# Patient Record
Sex: MALE | Race: OTHER | NOT HISPANIC OR LATINO | URBAN - METROPOLITAN AREA
[De-identification: names, ages, dates, MRNs, and addresses within clinical notes are randomized per-mention and may not be internally consistent; named-entity substitution may affect disease eponyms.]

---

## 2017-10-28 ENCOUNTER — EMERGENCY (EMERGENCY)
Facility: HOSPITAL | Age: 47
LOS: 0 days | Discharge: HOME | End: 2017-10-28
Attending: STUDENT IN AN ORGANIZED HEALTH CARE EDUCATION/TRAINING PROGRAM
Payer: COMMERCIAL

## 2017-10-28 VITALS
HEART RATE: 73 BPM | TEMPERATURE: 98 F | WEIGHT: 220.02 LBS | HEIGHT: 69 IN | OXYGEN SATURATION: 100 % | RESPIRATION RATE: 15 BRPM | SYSTOLIC BLOOD PRESSURE: 167 MMHG | DIASTOLIC BLOOD PRESSURE: 85 MMHG

## 2017-10-28 VITALS
SYSTOLIC BLOOD PRESSURE: 152 MMHG | DIASTOLIC BLOOD PRESSURE: 100 MMHG | RESPIRATION RATE: 16 BRPM | TEMPERATURE: 98 F | OXYGEN SATURATION: 98 % | HEART RATE: 71 BPM
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
## 2017-10-28 DIAGNOSIS — S39.012A STRAIN OF MUSCLE, FASCIA AND TENDON OF LOWER BACK, INITIAL ENCOUNTER: ICD-10-CM

## 2017-10-28 DIAGNOSIS — S16.1XXS STRAIN OF MUSCLE, FASCIA AND TENDON AT NECK LEVEL, SEQUELA: ICD-10-CM
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
## 2017-10-28 DIAGNOSIS — M54.9 DORSALGIA, UNSPECIFIED: ICD-10-CM

## 2017-10-28 PROCEDURE — 99053 MED SERV 10PM-8AM 24 HR FAC: CPT

## 2017-10-28 PROCEDURE — 99284 EMERGENCY DEPT VISIT MOD MDM: CPT | Mod: 25

## 2017-10-28 PROCEDURE — 70450 CT HEAD/BRAIN W/O DYE: CPT | Mod: 26

## 2017-10-28 RX ORDER — HYDROCHLOROTHIAZIDE 25 MG
25 TABLET ORAL ONCE
Qty: 0 | Refills: 0 | Status: COMPLETED | OUTPATIENT
Start: 2017-10-28 | End: 2017-10-28

## 2017-10-28 RX ADMIN — Medication 25 MILLIGRAM(S): at 09:43

## 2017-10-28 NOTE — ED PROVIDER NOTE - CARE PLAN
Principal Discharge DX:	Strain of neck muscle, sequela  Secondary Diagnosis:	Lumbar strain  Secondary Diagnosis:	Back spasm
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
## 2019-05-28 ENCOUNTER — EMERGENCY (EMERGENCY)
Facility: HOSPITAL | Age: 49
LOS: 0 days | Discharge: LEFT BEFORE DISCHARGE | End: 2019-05-28
Attending: EMERGENCY MEDICINE
Payer: MEDICAID

## 2019-05-28 VITALS
HEIGHT: 69 IN | OXYGEN SATURATION: 95 % | DIASTOLIC BLOOD PRESSURE: 116 MMHG | HEART RATE: 75 BPM | RESPIRATION RATE: 18 BRPM | WEIGHT: 220.02 LBS | TEMPERATURE: 99 F | SYSTOLIC BLOOD PRESSURE: 175 MMHG

## 2019-05-28 VITALS
OXYGEN SATURATION: 100 % | HEART RATE: 65 BPM | RESPIRATION RATE: 19 BRPM | TEMPERATURE: 99 F | SYSTOLIC BLOOD PRESSURE: 185 MMHG | DIASTOLIC BLOOD PRESSURE: 110 MMHG

## 2019-05-28 DIAGNOSIS — I10 ESSENTIAL (PRIMARY) HYPERTENSION: ICD-10-CM

## 2019-05-28 DIAGNOSIS — R60.0 LOCALIZED EDEMA: ICD-10-CM

## 2019-05-28 DIAGNOSIS — M25.561 PAIN IN RIGHT KNEE: ICD-10-CM

## 2019-05-28 DIAGNOSIS — R06.02 SHORTNESS OF BREATH: ICD-10-CM

## 2019-05-28 DIAGNOSIS — M25.562 PAIN IN LEFT KNEE: ICD-10-CM

## 2019-05-28 LAB
ALBUMIN SERPL ELPH-MCNC: 3.4 G/DL — SIGNIFICANT CHANGE UP (ref 3.3–5)
ALP SERPL-CCNC: 71 U/L — SIGNIFICANT CHANGE UP (ref 40–120)
ALT FLD-CCNC: 22 U/L — SIGNIFICANT CHANGE UP (ref 12–78)
ANION GAP SERPL CALC-SCNC: 9 MMOL/L — SIGNIFICANT CHANGE UP (ref 5–17)
APTT BLD: 28.2 SEC — LOW (ref 28.5–37)
AST SERPL-CCNC: 14 U/L — LOW (ref 15–37)
BILIRUB SERPL-MCNC: 0.5 MG/DL — SIGNIFICANT CHANGE UP (ref 0.2–1.2)
BUN SERPL-MCNC: 20 MG/DL — SIGNIFICANT CHANGE UP (ref 7–23)
CALCIUM SERPL-MCNC: 9 MG/DL — SIGNIFICANT CHANGE UP (ref 8.5–10.1)
CHLORIDE SERPL-SCNC: 107 MMOL/L — SIGNIFICANT CHANGE UP (ref 96–108)
CO2 SERPL-SCNC: 25 MMOL/L — SIGNIFICANT CHANGE UP (ref 22–31)
CREAT SERPL-MCNC: 1.16 MG/DL — SIGNIFICANT CHANGE UP (ref 0.5–1.3)
D DIMER BLD IA.RAPID-MCNC: <150 NG/ML DDU — SIGNIFICANT CHANGE UP
GLUCOSE SERPL-MCNC: 86 MG/DL — SIGNIFICANT CHANGE UP (ref 70–99)
HCT VFR BLD CALC: 45.8 % — SIGNIFICANT CHANGE UP (ref 39–50)
HGB BLD-MCNC: 15.6 G/DL — SIGNIFICANT CHANGE UP (ref 13–17)
INR BLD: 1.05 RATIO — SIGNIFICANT CHANGE UP (ref 0.88–1.16)
MCHC RBC-ENTMCNC: 30.4 PG — SIGNIFICANT CHANGE UP (ref 27–34)
MCHC RBC-ENTMCNC: 34.1 GM/DL — SIGNIFICANT CHANGE UP (ref 32–36)
MCV RBC AUTO: 89.1 FL — SIGNIFICANT CHANGE UP (ref 80–100)
NRBC # BLD: 0 /100 WBCS — SIGNIFICANT CHANGE UP (ref 0–0)
PLATELET # BLD AUTO: 175 K/UL — SIGNIFICANT CHANGE UP (ref 150–400)
POTASSIUM SERPL-MCNC: 4 MMOL/L — SIGNIFICANT CHANGE UP (ref 3.5–5.3)
POTASSIUM SERPL-SCNC: 4 MMOL/L — SIGNIFICANT CHANGE UP (ref 3.5–5.3)
PROT SERPL-MCNC: 7.2 GM/DL — SIGNIFICANT CHANGE UP (ref 6–8.3)
PROTHROM AB SERPL-ACNC: 11.8 SEC — SIGNIFICANT CHANGE UP (ref 10–12.9)
RBC # BLD: 5.14 M/UL — SIGNIFICANT CHANGE UP (ref 4.2–5.8)
RBC # FLD: 12.6 % — SIGNIFICANT CHANGE UP (ref 10.3–14.5)
SODIUM SERPL-SCNC: 141 MMOL/L — SIGNIFICANT CHANGE UP (ref 135–145)
TROPONIN I SERPL-MCNC: <.015 NG/ML — SIGNIFICANT CHANGE UP (ref 0.01–0.04)
WBC # BLD: 3.62 K/UL — LOW (ref 3.8–10.5)
WBC # FLD AUTO: 3.62 K/UL — LOW (ref 3.8–10.5)

## 2019-05-28 PROCEDURE — 93010 ELECTROCARDIOGRAM REPORT: CPT

## 2019-05-28 PROCEDURE — 99284 EMERGENCY DEPT VISIT MOD MDM: CPT

## 2019-05-28 PROCEDURE — 93970 EXTREMITY STUDY: CPT | Mod: 26

## 2019-05-28 PROCEDURE — 71045 X-RAY EXAM CHEST 1 VIEW: CPT | Mod: 26

## 2019-05-28 RX ADMIN — Medication 0.1 MILLIGRAM(S): at 15:08

## 2019-05-28 NOTE — ED PROVIDER NOTE - CARE PROVIDER_API CALL
Yusef Huitron)  Internal Medicine  [address], [address]  [place] [address]  Phone: [phone]  Fax: [phone]  Follow Up Time:     Sana Lux)  Surgery  [address], [address]  [place] [address]  Phone: [phone]  Fax: [phone]  Follow Up Time:

## 2019-05-28 NOTE — ED PROVIDER NOTE - PHYSICAL EXAMINATION
General:     NAD, well-nourished, well-appearing  Head:     NC/AT, EOMI, oral mucosa moist  Neck:     trachea midline  Lungs:     CTA b/l, no w/r/r  CVS:     S1S2, RRR, no m/g/r  Abd:     +BS, s/nt/nd, no organomegaly  Ext:    2+ radial and pedal pulses, trace bilateral pedal edema  Neuro: AAOx3, no sensory/motor deficits
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
## 2019-05-28 NOTE — ED ADULT NURSE NOTE - NSIMPLEMENTINTERV_GEN_ALL_ED
Implemented All Universal Safety Interventions:  [place] to call system. Call bell, personal items and telephone within reach. Instruct patient to call for assistance. Room bathroom lighting operational. Non-slip footwear when patient is off stretcher. Physically safe environment: no spills, clutter or unnecessary equipment. Stretcher in lowest position, wheels locked, appropriate side rails in place.

## 2019-05-28 NOTE — ED PROVIDER NOTE - CLINICAL SUMMARY MEDICAL DECISION MAKING FREE TEXT BOX
Patient arrived with elevated BP and leg swelling, us with no dvt, f/up with pmd and vascular.  patient refusing to stay for further evaluation and monitoring of bp because he has to  daughter. informed he is leaving [place]. The patient has decided to leave against medical advice.  We discussed all risks, benefits, and alternatives to the progression of treatment and the potential dangers of leaving including but not limited to permanent disability, injury, and death.  The patient was instructed that they are welcome to change their decision to leave against medical advice and return to the emergency department at any time and for any reason in order to allow us to render care.

## 2019-05-28 NOTE — ED ADULT NURSE NOTE - OBJECTIVE STATEMENT
pt received to holding bed F c/o bilateral leg pain, pain is worst to the right knee. 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

## 2019-05-28 NOTE — ED PROVIDER NOTE - NS ED ROS FT
Constitutional: (-) fever  (-)chills  (-)sweats  Eyes/ENT: (-) blurry vision, (-) epistaxis  (-)rhinorrhea   (-) sore throat    Cardiovascular: (-) chest pain, (-) palpitations (-) edema   Respiratory: (-) cough, (+) shortness of breath   Gastrointestinal: (-)nausea  (-)vomiting, (-) diarrhea  (-) abdominal pain   :  (-)dysuria, (-)frequency, (-)urgency, (-)hematuria  Musculoskeletal: (-) neck pain, (-) back pain, (+) joint pain  Integumentary: (-) rash, (+) edema  Neurological: (-) headache, (-) altered mental status  (-)LOC

## 2019-05-28 NOTE — ED ADULT TRIAGE NOTE - CHIEF COMPLAINT QUOTE
patient c/o of R knee swelling and pain started 2 months ago, denied chest pain , denied back pain denied difficulty breathing at the time of triage, denied any pain at the time of triage

## 2019-05-28 NOTE — ED PROVIDER NOTE - NSFOLLOWUPINSTRUCTIONS_ED_ALL_ED_FT
You have decided to leave against medical advice.  We discussed all risks, benefits, and alternatives to the progression of treatment and the potential dangers of leaving including but not limited to permanent disability, injury, and death.  You are welcome to change your decision to leave against medical advice and return to the emergency department at any time and for any reason in order to allow us to render care.

## 2021-01-11 NOTE — ED ADULT NURSE NOTE - NSSUSCREENINGQ1_ED_ALL_ED
How Severe Is It?: moderate Is This A New Presentation, Or A Follow-Up?: Follow Up Isotretinoin Display Cumulative Dose In The Note?: Yes When Was Isotretinoin Started?: 12/07/2020 Patient Reported Weight In Pounds (Required For Calculation): 252 No

## 2022-04-29 ENCOUNTER — EMERGENCY (EMERGENCY)
Facility: HOSPITAL | Age: 52
LOS: 0 days | Discharge: HOME | End: 2022-04-29
Payer: MEDICAID

## 2022-04-29 ENCOUNTER — EMERGENCY (EMERGENCY)
Facility: HOSPITAL | Age: 52
LOS: 0 days | Discharge: HOME | End: 2022-04-29
Attending: STUDENT IN AN ORGANIZED HEALTH CARE EDUCATION/TRAINING PROGRAM
Payer: COMMERCIAL

## 2022-04-29 VITALS
DIASTOLIC BLOOD PRESSURE: 103 MMHG | RESPIRATION RATE: 18 BRPM | TEMPERATURE: 98 F | OXYGEN SATURATION: 99 % | HEART RATE: 93 BPM | SYSTOLIC BLOOD PRESSURE: 163 MMHG

## 2022-04-29 VITALS
TEMPERATURE: 98 F | SYSTOLIC BLOOD PRESSURE: 170 MMHG | HEART RATE: 105 BPM | WEIGHT: 220.02 LBS | RESPIRATION RATE: 18 BRPM | HEIGHT: 69 IN | OXYGEN SATURATION: 97 % | DIASTOLIC BLOOD PRESSURE: 111 MMHG

## 2022-04-29 VITALS
SYSTOLIC BLOOD PRESSURE: 188 MMHG | HEIGHT: 69 IN | DIASTOLIC BLOOD PRESSURE: 122 MMHG | WEIGHT: 220.02 LBS | TEMPERATURE: 98 F | HEART RATE: 77 BPM | RESPIRATION RATE: 18 BRPM | OXYGEN SATURATION: 97 %

## 2022-04-29 VITALS
OXYGEN SATURATION: 99 % | TEMPERATURE: 98 F | RESPIRATION RATE: 15 BRPM | DIASTOLIC BLOOD PRESSURE: 86 MMHG | HEART RATE: 73 BPM | SYSTOLIC BLOOD PRESSURE: 139 MMHG

## 2022-04-29 DIAGNOSIS — R51.9 HEADACHE, UNSPECIFIED: ICD-10-CM

## 2022-04-29 DIAGNOSIS — S13.4XXA SPRAIN OF LIGAMENTS OF CERVICAL SPINE, INITIAL ENCOUNTER: ICD-10-CM

## 2022-04-29 DIAGNOSIS — M54.2 CERVICALGIA: ICD-10-CM
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
## 2022-04-29 DIAGNOSIS — M25.512 PAIN IN LEFT SHOULDER: ICD-10-CM

## 2022-04-29 DIAGNOSIS — E78.5 HYPERLIPIDEMIA, UNSPECIFIED: ICD-10-CM
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
## 2022-04-29 DIAGNOSIS — S16.1XXA STRAIN OF MUSCLE, FASCIA AND TENDON AT NECK LEVEL, INITIAL ENCOUNTER: ICD-10-CM

## 2022-04-29 DIAGNOSIS — S39.012A STRAIN OF MUSCLE, FASCIA AND TENDON OF LOWER BACK, INITIAL ENCOUNTER: ICD-10-CM

## 2022-04-29 DIAGNOSIS — I10 ESSENTIAL (PRIMARY) HYPERTENSION: ICD-10-CM
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
## 2022-04-29 DIAGNOSIS — M54.50 LOW BACK PAIN, UNSPECIFIED: ICD-10-CM

## 2022-04-29 PROCEDURE — 72125 CT NECK SPINE W/O DYE: CPT | Mod: 26,MH

## 2022-04-29 PROCEDURE — 72100 X-RAY EXAM L-S SPINE 2/3 VWS: CPT | Mod: 26

## 2022-04-29 PROCEDURE — 73030 X-RAY EXAM OF SHOULDER: CPT | Mod: 26,LT

## 2022-04-29 PROCEDURE — 99053 MED SERV 10PM-8AM 24 HR FAC: CPT | Mod: NC

## 2022-04-29 PROCEDURE — 72040 X-RAY EXAM NECK SPINE 2-3 VW: CPT | Mod: 26

## 2022-04-29 PROCEDURE — 99284 EMERGENCY DEPT VISIT MOD MDM: CPT | Mod: NC

## 2022-04-29 PROCEDURE — 99284 EMERGENCY DEPT VISIT MOD MDM: CPT

## 2022-04-29 RX ORDER — KETOROLAC TROMETHAMINE 30 MG/ML
30 SYRINGE (ML) INJECTION ONCE
Refills: 0 | Status: DISCONTINUED | OUTPATIENT
Start: 2022-04-29 | End: 2022-04-29

## 2022-04-29 RX ORDER — CYCLOBENZAPRINE HYDROCHLORIDE 10 MG/1
10 TABLET, FILM COATED ORAL ONCE
Refills: 0 | Status: COMPLETED | OUTPATIENT
Start: 2022-04-29 | End: 2022-04-29

## 2022-04-29 RX ORDER — HYDRALAZINE HCL 50 MG
10 TABLET ORAL ONCE
Refills: 0 | Status: COMPLETED | OUTPATIENT
Start: 2022-04-29 | End: 2022-04-29

## 2022-04-29 RX ORDER — AMLODIPINE BESYLATE 2.5 MG/1
1 TABLET ORAL
Qty: 30 | Refills: 0
Start: 2022-04-29 | End: 2022-05-28

## 2022-04-29 RX ORDER — METHOCARBAMOL 500 MG/1
2 TABLET, FILM COATED ORAL
Qty: 40 | Refills: 0
Start: 2022-04-29 | End: 2022-05-03

## 2022-04-29 RX ORDER — IBUPROFEN 200 MG
1 TABLET ORAL
Qty: 20 | Refills: 0
Start: 2022-04-29 | End: 2022-05-03

## 2022-04-29 RX ORDER — AMLODIPINE BESYLATE 2.5 MG/1
10 TABLET ORAL ONCE
Refills: 0 | Status: COMPLETED | OUTPATIENT
Start: 2022-04-29 | End: 2022-04-29

## 2022-04-29 RX ORDER — METHOCARBAMOL 500 MG/1
1000 TABLET, FILM COATED ORAL ONCE
Refills: 0 | Status: COMPLETED | OUTPATIENT
Start: 2022-04-29 | End: 2022-04-29

## 2022-04-29 RX ORDER — SODIUM CHLORIDE 9 MG/ML
3 INJECTION INTRAMUSCULAR; INTRAVENOUS; SUBCUTANEOUS EVERY 8 HOURS
Refills: 0 | Status: DISCONTINUED | OUTPATIENT
Start: 2022-04-29 | End: 2022-04-29

## 2022-04-29 RX ORDER — SIMVASTATIN 20 MG/1
1 TABLET, FILM COATED ORAL
Qty: 30 | Refills: 0
Start: 2022-04-29 | End: 2022-05-28

## 2022-04-29 RX ORDER — IBUPROFEN 200 MG
600 TABLET ORAL ONCE
Refills: 0 | Status: COMPLETED | OUTPATIENT
Start: 2022-04-29 | End: 2022-04-29

## 2022-04-29 RX ADMIN — Medication 600 MILLIGRAM(S): at 18:30

## 2022-04-29 RX ADMIN — CYCLOBENZAPRINE HYDROCHLORIDE 10 MILLIGRAM(S): 10 TABLET, FILM COATED ORAL at 17:34

## 2022-04-29 RX ADMIN — Medication 30 MILLIGRAM(S): at 04:54

## 2022-04-29 RX ADMIN — METHOCARBAMOL 1000 MILLIGRAM(S): 500 TABLET, FILM COATED ORAL at 04:54

## 2022-04-29 RX ADMIN — Medication 600 MILLIGRAM(S): at 17:35

## 2022-04-29 RX ADMIN — AMLODIPINE BESYLATE 10 MILLIGRAM(S): 2.5 TABLET ORAL at 17:39

## 2022-04-29 RX ADMIN — SODIUM CHLORIDE 3 MILLILITER(S): 9 INJECTION INTRAMUSCULAR; INTRAVENOUS; SUBCUTANEOUS at 03:25

## 2022-04-29 RX ADMIN — Medication 30 MILLIGRAM(S): at 06:35

## 2022-04-29 RX ADMIN — Medication 10 MILLIGRAM(S): at 03:24

## 2022-04-29 RX ADMIN — SODIUM CHLORIDE 3 MILLILITER(S): 9 INJECTION INTRAMUSCULAR; INTRAVENOUS; SUBCUTANEOUS at 06:35
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
## 2022-04-29 NOTE — ED PROVIDER NOTE - PATIENT PORTAL LINK FT
You can access the FollowMyHealth Patient Portal offered by [facility] by registering at the following website: [email or web address]. By joining [employer]’s FollowMyHealth portal, you will also be able to view your health information using other applications (apps) compatible with our system.
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
## 2022-04-29 NOTE — ED POST DISCHARGE NOTE - RESULT SUMMARY
+ swelling noted on xray, CT recommended, spoke to patient and his wife, patient returning to the ED
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
## 2022-04-29 NOTE — ED ADULT NURSE NOTE - NS_ED_NURSE_TEACHING_TOPIC_ED_A_ED
f/u with pmd for bp mgmt, f/u with orthopedist (has appointment this coming monday)/Cardiac/Orthopedic

## 2022-04-29 NOTE — ED ADULT NURSE NOTE - CHPI ED NUR SYMPTOMS NEG
no acting out behaviors/no crying/no decreased eating/drinking/no difficulty bearing weight/no disorientation/no dizziness/no fussiness/no laceration/no loss of consciousness

## 2022-04-29 NOTE — ED ADULT NURSE NOTE - PAIN RATING/NUMBER SCALE (0-10): ACTIVITY
Take your levothyroxine, alone, first thing in the morning on an empty stomach with a few sips of water. Wait for one hour before you take anything else including drinks, coffee, breakfast or pills.  Calcium, potassium, iron, magnesium and any other minerals should be at least 3 hours later to improve the absorption of levothyroxine.  If you forget to take levothyroxine in the morning, still take it later in the day, an hour before a meal or 3 hours after a meal.        Continue Levothyroxine as usual:  Day 1:  Thyrogen shot   Day 2:  Thyrogen shot and Iodine-123 capsule   Day 3:  Iodine-123 imaging, and Iodine-131 therapy, blood draw for  Tg level  Day 3, 4, 5:  Isolation   Day 7:  Post therapy imaging         
9
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
## 2022-04-29 NOTE — ED PROVIDER NOTE - PATIENT PORTAL LINK FT
You can access the FollowMyHealth Patient Portal offered by [facility] by registering at the following website: [email or web address]. By joining [employer]’s FollowMyHealth portal, you will also be able to view your health information using other applications (apps) compatible with our system.
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
## 2022-04-29 NOTE — ED ADULT NURSE NOTE - NSIMPLEMENTINTERV_GEN_ALL_ED
Implemented All Universal Safety Interventions:  [place] to call system. Call bell, personal items and telephone within reach. Instruct patient to call for assistance. Room bathroom lighting operational. Non-slip footwear when patient is off stretcher. Physically safe environment: no spills, clutter or unnecessary equipment. Stretcher in lowest position, wheels locked, appropriate side rails in place.

## 2022-04-29 NOTE — ED ADULT NURSE NOTE - DRUG PRE-SCREENING (DAST -1)
There are no preventive care reminders to display for this patient.    Patient is up to date, no discussion needed.             Statement Selected BLEEDING

## 2022-08-29 ENCOUNTER — OUTPATIENT (OUTPATIENT)
Dept: OUTPATIENT SERVICES | Facility: HOSPITAL | Age: 52
LOS: 1 days | End: 2022-08-29
Payer: MEDICAID

## 2022-08-29 DIAGNOSIS — Z11.52 ENCOUNTER FOR SCREENING FOR COVID-19: ICD-10-CM

## 2022-08-29 LAB
SARS-COV-2 RNA SPEC QL NAA+PROBE: SIGNIFICANT CHANGE UP

## 2022-08-29 PROCEDURE — C9803: CPT

## 2022-08-29 PROCEDURE — U0003: CPT

## 2022-08-29 PROCEDURE — U0005: CPT

## 2022-08-31 ENCOUNTER — OUTPATIENT (OUTPATIENT)
Dept: OUTPATIENT SERVICES | Facility: HOSPITAL | Age: 52
LOS: 1 days | End: 2022-08-31
Payer: MEDICAID

## 2022-08-31 VITALS — HEIGHT: 69 IN | WEIGHT: 220.02 LBS

## 2022-08-31 VITALS
SYSTOLIC BLOOD PRESSURE: 170 MMHG | OXYGEN SATURATION: 99 % | HEART RATE: 73 BPM | DIASTOLIC BLOOD PRESSURE: 92 MMHG | RESPIRATION RATE: 18 BRPM

## 2022-08-31 DIAGNOSIS — R94.39 ABNORMAL RESULT OF OTHER CARDIOVASCULAR FUNCTION STUDY: ICD-10-CM

## 2022-08-31 LAB
ANION GAP SERPL CALC-SCNC: 9 MMOL/L — SIGNIFICANT CHANGE UP (ref 5–17)
BUN SERPL-MCNC: 13 MG/DL — SIGNIFICANT CHANGE UP (ref 7–23)
CALCIUM SERPL-MCNC: 9.2 MG/DL — SIGNIFICANT CHANGE UP (ref 8.4–10.5)
CHLORIDE SERPL-SCNC: 103 MMOL/L — SIGNIFICANT CHANGE UP (ref 96–108)
CO2 SERPL-SCNC: 24 MMOL/L — SIGNIFICANT CHANGE UP (ref 22–31)
CREAT SERPL-MCNC: 1.03 MG/DL — SIGNIFICANT CHANGE UP (ref 0.5–1.3)
EGFR: 88 ML/MIN/1.73M2 — SIGNIFICANT CHANGE UP
GLUCOSE SERPL-MCNC: 108 MG/DL — HIGH (ref 70–99)
HCT VFR BLD CALC: 44.5 % — SIGNIFICANT CHANGE UP (ref 39–50)
HGB BLD-MCNC: 15.1 G/DL — SIGNIFICANT CHANGE UP (ref 13–17)
MCHC RBC-ENTMCNC: 30.5 PG — SIGNIFICANT CHANGE UP (ref 27–34)
MCHC RBC-ENTMCNC: 33.9 GM/DL — SIGNIFICANT CHANGE UP (ref 32–36)
MCV RBC AUTO: 89.9 FL — SIGNIFICANT CHANGE UP (ref 80–100)
NRBC # BLD: 0 /100 WBCS — SIGNIFICANT CHANGE UP (ref 0–0)
PLATELET # BLD AUTO: 177 K/UL — SIGNIFICANT CHANGE UP (ref 150–400)
POTASSIUM SERPL-MCNC: 4.3 MMOL/L — SIGNIFICANT CHANGE UP (ref 3.5–5.3)
POTASSIUM SERPL-SCNC: 4.3 MMOL/L — SIGNIFICANT CHANGE UP (ref 3.5–5.3)
RBC # BLD: 4.95 M/UL — SIGNIFICANT CHANGE UP (ref 4.2–5.8)
RBC # FLD: 13.4 % — SIGNIFICANT CHANGE UP (ref 10.3–14.5)
SODIUM SERPL-SCNC: 136 MMOL/L — SIGNIFICANT CHANGE UP (ref 135–145)
WBC # BLD: 4.12 K/UL — SIGNIFICANT CHANGE UP (ref 3.8–10.5)
WBC # FLD AUTO: 4.12 K/UL — SIGNIFICANT CHANGE UP (ref 3.8–10.5)

## 2022-08-31 PROCEDURE — C1894: CPT

## 2022-08-31 PROCEDURE — 85027 COMPLETE CBC AUTOMATED: CPT

## 2022-08-31 PROCEDURE — 93458 L HRT ARTERY/VENTRICLE ANGIO: CPT

## 2022-08-31 PROCEDURE — 93458 L HRT ARTERY/VENTRICLE ANGIO: CPT | Mod: 26

## 2022-08-31 PROCEDURE — C1887: CPT

## 2022-08-31 PROCEDURE — 99152 MOD SED SAME PHYS/QHP 5/>YRS: CPT

## 2022-08-31 PROCEDURE — C1769: CPT

## 2022-08-31 PROCEDURE — 80048 BASIC METABOLIC PNL TOTAL CA: CPT

## 2022-08-31 PROCEDURE — 93010 ELECTROCARDIOGRAM REPORT: CPT

## 2022-08-31 PROCEDURE — 36415 COLL VENOUS BLD VENIPUNCTURE: CPT

## 2022-08-31 PROCEDURE — 93005 ELECTROCARDIOGRAM TRACING: CPT

## 2022-08-31 NOTE — ASU DISCHARGE PLAN (ADULT/PEDIATRIC) - CARE PROVIDER_API CALL
Rafael Herring)  Cardiology Medicine  [address], [place] [address]  Phone: [phone]  Fax: [phone]  Follow Up Time:

## 2022-08-31 NOTE — H&P CARDIOLOGY - HISTORY OF PRESENT ILLNESS
51 y/o Male with PMHx of HTN, HLD presents toady for an elective LHC following an abnormal NST. Patient states he gets occasional shortness of breath at night which keeps him awake at night along with occasional dizziness. Was seen by Dr. Herring, NST done demonstrates severe inferior and septal hypokinesis and there os moderate apical hypokinesis. There is mild reduced uptake in the anterior wall which is partially reversible. There is also very mild fixed reduced uptake in the inferior wall suggestive of a scar or diaphragm attenuation.  EF  was 35%. Seen and evaluated by cardiologist and  referred for LHC. Denies any implanted cardiac device.

## 2022-08-31 NOTE — ASU DISCHARGE PLAN (ADULT/PEDIATRIC) - NS MD DC FALL RISK RISK
For information on Fall & Injury Prevention, visit: [email or web address] OR  [email or web address] OR  https://www.cdc.gov/steadi/patient.html

## 2022-08-31 NOTE — ASU DISCHARGE PLAN (ADULT/PEDIATRIC) - ASU DC SPECIAL INSTRUCTIONSFT
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
## 2023-11-28 RX ORDER — AMLODIPINE BESYLATE 2.5 MG/1
1 TABLET ORAL
Qty: 0 | Refills: 0 | DISCHARGE

## 2023-11-28 RX ORDER — ATORVASTATIN CALCIUM 80 MG/1
1 TABLET, FILM COATED ORAL
Qty: 0 | Refills: 0 | DISCHARGE

## 2023-11-28 RX ORDER — ASPIRIN/CALCIUM CARB/MAGNESIUM 324 MG
1 TABLET ORAL
Qty: 0 | Refills: 0 | DISCHARGE

## 2023-11-29 PROBLEM — E78.5 HYPERLIPIDEMIA, UNSPECIFIED: Chronic | Status: ACTIVE | Noted: 2022-04-29

## 2023-11-29 PROBLEM — I10 ESSENTIAL (PRIMARY) HYPERTENSION: Chronic | Status: ACTIVE | Noted: 2022-04-29
